# Patient Record
Sex: MALE | Race: WHITE | Employment: FULL TIME | URBAN - METROPOLITAN AREA
[De-identification: names, ages, dates, MRNs, and addresses within clinical notes are randomized per-mention and may not be internally consistent; named-entity substitution may affect disease eponyms.]

---

## 2020-12-30 ENCOUNTER — HOSPITAL ENCOUNTER (OUTPATIENT)
Dept: LAB | Age: 60
Discharge: HOME | End: 2020-12-30
Payer: COMMERCIAL

## 2020-12-30 ENCOUNTER — OFFICE VISIT (OUTPATIENT)
Dept: HEMATOLOGY | Age: 60
End: 2020-12-30
Payer: COMMERCIAL

## 2020-12-30 VITALS
SYSTOLIC BLOOD PRESSURE: 154 MMHG | HEART RATE: 58 BPM | HEIGHT: 70 IN | WEIGHT: 175.25 LBS | BODY MASS INDEX: 25.09 KG/M2 | DIASTOLIC BLOOD PRESSURE: 83 MMHG | OXYGEN SATURATION: 99 % | TEMPERATURE: 98.6 F

## 2020-12-30 DIAGNOSIS — B18.2 CHRONIC HEPATITIS C WITHOUT HEPATIC COMA (HCC): ICD-10-CM

## 2020-12-30 DIAGNOSIS — B18.2 CHRONIC HEPATITIS C WITHOUT HEPATIC COMA (HCC): Primary | ICD-10-CM

## 2020-12-30 LAB
HBV SURFACE AB SER QL IA: NEGATIVE
HBV SURFACE AB SERPL IA-ACNC: <3.1 MIU/ML
HBV SURFACE AG SER QL: <0.1 INDEX
HBV SURFACE AG SER QL: NEGATIVE
HEP BS AB COMMENT,HBSAC: ABNORMAL

## 2020-12-30 PROCEDURE — 87340 HEPATITIS B SURFACE AG IA: CPT

## 2020-12-30 PROCEDURE — 36415 COLL VENOUS BLD VENIPUNCTURE: CPT

## 2020-12-30 PROCEDURE — 99203 OFFICE O/P NEW LOW 30 MIN: CPT | Performed by: INTERNAL MEDICINE

## 2020-12-30 PROCEDURE — 86706 HEP B SURFACE ANTIBODY: CPT

## 2020-12-30 PROCEDURE — 86708 HEPATITIS A ANTIBODY: CPT

## 2020-12-30 PROCEDURE — 86704 HEP B CORE ANTIBODY TOTAL: CPT

## 2020-12-30 NOTE — PROGRESS NOTES
[facility] hospitals, Jose DUEÑAS, Alise Mcduffie MD, MPH      Maged Alston, PA-ARABELLA Nova, [facility]     Uyen Anguiano, [facility]   OLY Finn-ARABELLA Luis, [facility]       William Keane Kimani De Mcnulty 136    at [address], [address] [place] [address]    [address] [facility].    [phone]    FAX: [address] [place] [address], [address]    [phone]    FAX: [phone]         Patient Care Team:  El Pope MD as PCP - General (Internal Medicine)      Problem List  Date Reviewed: 12/30/2020          Codes Class Noted    Chronic hepatitis C [facility]) ICD-10-CM: O35.3  ICD-9-CM: 070.54  12/30/2020                The clinicians listed above have asked me to see [facility] in consultation regarding chronic HCV and its management. All medical records sent by the referring physicians were reviewed     The patient is a 61 y.o.  male who was found to have abnormalities in liver chemistries and subsequently tested positive for chronic HCV in 2000. Risk factors for acquiring HCV are not apparent. There was no history of acute icteric hepatitis at the time of these risk factors. Imaging of the liver was not recently performed. The patient was seen by me in the early 2000s at [facility] in [address] [facility]. A liver biopsy was performed in 2000. The results are not available at this time. The patient is not sure what the results were. He was treated with PEGINF and RBV for 6 months. Treatment was then stopped. The patient has never received treatment for chronic HCV. The patient has no symptoms which can be attributed to the liver disorder.     The patient is not currently experiencing the following symptoms of liver disease:  fatigue, pain in the right side over the liver,     The patient completes all daily activities without any functional limitations. ASSESSMENT AND PLAN:  Chronic HCV   Chronic HCV of unclear severity. AST is normal.  ALT is elevated. ALP is normal.  Liver function is normal.  The platelet count is normal.      HCV viral load is Log 6.3 intU. HCV genotype is 1A. Will perform  serologic studies for HBV. Will perform imaging of the liver with ultrasound. The need to perform an assessment of liver fibrosis was discussed with the patient. The Fibroscan can assess liver fibrosis and determine if a patient has advanced fibrosis or cirrhosis without the need for liver biopsy. This will be performed at the next office visit. Chronic HCV Treatment  The patient was previously treated for HCV with peginterferon and ribavirin   The previous treatment response was probably a partial virologic response. The patient has HCV genotype 1A. The patient should be treated with Harvoni (sofasbuvir and ledipasvir), Mavyret (glecaprevir and piprentasvir) or Epclusa (sofosbuvir and valpitasvir). The SVR/cure rate for is over 95%. Screening for Hepatocellular Carcinoma  HCC screening is not necessary if the patient has no evidence of cirrhosis. Treatment of other medical problems in patients with chronic liver disease  There are no contraindications for the patient to take most medications that are necessary for treatment of other medical issues. Counseling for alcohol in patients with chronic liver disease  The patient was counseled regarding alcohol consumption and the effect of alcohol on chronic liver disease. The patient does not consume a significant amount of alcohol. Vaccinations   The need for vaccination against viral hepatitis A and B will be assessed with serologic and instituted as appropriate.   Routine vaccinations against other bacterial and viral agents can be performed as indicated. Annual flu vaccination should be administered if indicated. ALLERGIES  Not on File    MEDICATIONS  No current outpatient medications on file. No current facility-administered medications for this visit. SYSTEM REVIEW NOT RELATED TO LIVER DISEASE OR REVIEWED ABOVE:  Constitution systems: Negative for fever, chills, weight gain, weight loss. Eyes: Negative for visual changes. ENT: Negative for sore throat, painful swallowing. Respiratory: Negative for cough, hemoptysis, SOB. Cardiology: Negative for chest pain, palpitations. GI:  Negative for constipation or diarrhea. : Negative for urinary frequency, dysuria, hematuria, nocturia. Skin: Negative for rash. Hematology: Negative for easy bruising, blood clots. Musculo-skelatal: Negative for back pain, muscle pain, weakness. Neurologic: Negative for headaches, dizziness, vertigo, memory problems not related to HE. Psychology: Negative for anxiety, depression. FAMILY HISTORY:  The father  of MI. The mother Has/had the following chronic disease(s): HTN,. There is no family history of liver disease. SOCIAL HISTORY:  The patient is . The spouse has been tested for HCV and is negative. The patient has 2 children, and 3 grandchildren. The patient has never used tobacco products. The patient consumes 3-5 alcoholic beverages per week. The patient currently works full time  for [employer]. PHYSICAL EXAMINATION:  Visit Vitals  BP (!) 154/83   Pulse (!) 58   Temp 98.6 °F (37 °C) (Tympanic)   Ht 5' 10\" (1.778 m)   Wt 175 lb 4 oz (79.5 kg)   SpO2 99%   BMI 25.15 kg/m²     General: No acute distress. Eyes: Sclera anicteric. ENT: No oral lesions. Thyroid normal.  Nodes: No adenopathy. Skin: No spider angiomata. No jaundice. No palmar erythema. Respiratory: Lungs clear to auscultation. Cardiovascular: Regular heart rate. No murmurs.   No JVD.  Abdomen: Soft non-tender. Liver size normal to percussion/palpation. Spleen not palpable. No obvious ascites. Extremities: No edema. No muscle wasting. No gross arthritic changes. Neurologic: Alert and oriented. Cranial nerves grossly intact. No asterixis. LABORATORY STUDIES:  From 11/2020  AST/ALT/ALP/T Bili/ALB:  26/41/42/0.6/3.9  WBC/HB/PLT/INR:  7.2/14.5/227  NA/BUN/CREAT:  13/0.83    SEROLOGIES:  11/2019. HCV RNA Log 6.75 intU  11/2020. HCV RNA log 6.31 intU, HCV genotype 1A    LIVER HISTOLOGY:  Not available or performed    ENDOSCOPIC PROCEDURES:  Not available or performed    RADIOLOGY:  Not available or performed    OTHER TESTING:  Not available or performed    FOLLOW-UP:  All of the issues listed above in the Assessment and Plan were discussed with the patient. All questions were answered. The patient expressed a clear understanding of the above. [address] [facility] 87 in 2 weeks for Fibroscan and to initiate HCV treatment.       Deejay Pineda MD  [address]  [address] [facility], [address] [place] [address] Sandi Hernandez, [address]  [address] [facility] skin normal color for race, warm, dry and intact.

## 2020-12-30 NOTE — Clinical Note
12/30/2020 Patient: Aviva Morales YOB: 1960 Date of Visit: 12/30/2020 Tangela Pereira MD 
[facility] [phone] Via Fax: [phone] Dear Tangela Pereira MD, Thank you for referring Mr. Saray Mc to [facility] Perla Whitman for evaluation. My notes for this consultation are attached. If you have questions, please do not hesitate to call me. I look forward to following your patient along with you. Sincerely, Yoko Gilliam MD

## 2020-12-31 LAB
HAV AB SER QL IA: NEGATIVE
HBV CORE AB SERPL QL IA: NEGATIVE

## 2021-01-15 ENCOUNTER — HOSPITAL ENCOUNTER (OUTPATIENT)
Dept: ULTRASOUND IMAGING | Age: 61
Discharge: HOME | End: 2021-01-15
Attending: INTERNAL MEDICINE
Payer: COMMERCIAL

## 2021-01-15 PROCEDURE — 76705 ECHO EXAM OF ABDOMEN: CPT

## 2021-01-19 ENCOUNTER — OFFICE VISIT (OUTPATIENT)
Dept: HEMATOLOGY | Age: 61
End: 2021-01-19
Payer: COMMERCIAL

## 2021-01-19 VITALS
RESPIRATION RATE: 16 BRPM | BODY MASS INDEX: 25.2 KG/M2 | OXYGEN SATURATION: 98 % | WEIGHT: 176 LBS | DIASTOLIC BLOOD PRESSURE: 87 MMHG | HEIGHT: 70 IN | TEMPERATURE: 97.9 F | HEART RATE: 57 BPM | SYSTOLIC BLOOD PRESSURE: 159 MMHG

## 2021-01-19 DIAGNOSIS — B18.2 CHRONIC HEPATITIS C WITHOUT HEPATIC COMA (HCC): Primary | ICD-10-CM

## 2021-01-19 PROCEDURE — 91200 LIVER ELASTOGRAPHY: CPT | Performed by: NURSE PRACTITIONER

## 2021-01-19 PROCEDURE — 99214 OFFICE O/P EST MOD 30 MIN: CPT | Performed by: NURSE PRACTITIONER

## 2021-01-19 RX ORDER — GLECAPREVIR AND PIBRENTASVIR 40; 100 MG/1; MG/1
3 TABLET, FILM COATED ORAL DAILY
Qty: 84 TAB | Refills: 1 | Status: SHIPPED | OUTPATIENT
Start: 2021-01-19 | End: 2021-01-25

## 2021-01-19 NOTE — PROGRESS NOTES
[address] [facility] [place], Cite Mongi Slim, Grayce Barcelona, MD, MPH      ANTONINO Sanchez [facility]     April S Annmarie [facility]   FORTUNATO Red [facility]       [facility] [place] [facility] De Mcnulty 136    at [address], [facility] Kalie Malcolm  22.    [phone]    FAX: [address] [place] [address], [address]    [phone]    FAX: [phone]         Patient Care Team:  Chris Leonardo MD as PCP - General (Internal Medicine)      Problem List  Date Reviewed: 1/19/2021          Codes Class Noted    Chronic hepatitis C [facility]) ICD-10-CM: B18.2  ICD-9-CM: 070.54  12/30/2020                Clarence Garcia returns to the [facility] [employer] today for fibroscan assessment of hepatic fibrosis and for education and management of chronic hepatitis C. The active problem list, all pertinent past medical history, medications, liver histology, endoscopic studies, radiologic findings and laboratory findings related to the liver disorder were reviewed with the patient. The patient is a 64 y.o.  male who was found to have abnormalities in liver chemistries and subsequently tested positive for chronic HCV in 2000. Risk factors for acquiring HCV are not apparent. There was no history of acute icteric hepatitis at the time of these risk factors. Imaging of the liver not recently performed with ultrasound. Ultrasound imaging demonstrates a normal appearing liver. There are no suspicious hepatic masses. The patient was seen by Dr. Kaylin Barton  in the early 2000s at [facility] in [place]. A liver biopsy was performed in 2000. The results are not available at this time.   The patient is not sure what the results were. Fibroscan analysis was performed in the office today. Results of the scan indicate mild to moderate hepatic fibrosis with a Metavir Fibrosis score of F2. There is no evidence of fatty liver per the scan. The patient was treated with PEGINF and RBV for 6 months in the early 2000's at [facility]. Treatment was then stopped. The response to treatment is not available at this time. The patient is unsure of his response to treatment. The patient has no symptoms which can be attributed to the liver disorder. The patient completes all daily activities without any functional limitations. The patient has not experienced fatigue, fevers, chills, shortness of breath, chest pain, pain in the right side over the liver, diffuse abdominal pain, nausea, vomiting, constipation, diarrhrea, dry eyes, dry mouth, arthralgias, myalgias, yellowing of the eyes or skin, itching, dark urine, problems concentrating, swelling of the abdomen, swelling of the lower extremities, hematemesis, or hematochezia. ASSESSMENT AND PLAN:  Chronic HCV   Chronic HCV of with mild to moderate hepatic fibrosis/liver stiffness. The most recent laboratory studies indicate that the liver transaminases are normal, alkaline phosphatase is normal, tests of hepatic synthetic and metabolic function are   normal, and the platelet count is normal.      HCV viral load is Log 6.3 intU. HCV genotype is 1A. The need to perform an assessment of liver fibrosis was discussed with the patient. The Fibroscan can assess liver fibrosis and determine if a patient has advanced fibrosis or cirrhosis without the need for liver biopsy. This was performed in the office during this appointment. Results of the scan indicate mild to moderate hepatic fibrosis. There is no evidence of hepatic steatosis.       The Fibroscan can be repeated annually or as often as clinically indicated to assess for fibrosis progression and/or regression. Chronic HCV Treatment  The patient was previously treated for HCV with peginterferon and ribavirin. The previous treatment response was probably a partial virologic response. The patient has HCV genotype 1A. The patient prefers the shorter duration of antiviral therapy offered by using Mavyret. Mckenzie Victor Hugo is a maldonado genotypic treatment regime utilizing glecaprevir (an NS3/4A protease inhibitor) and pibrentasvir (an NS5A inhibitor) for 8 weeks. I explained that he is to take three tabs daily in a single dose with food. The treatment regime was discussed in detail, including duration of treatment, dosing of the medication, and medication side effects. Educational material was provided. Mckenzie Flair was ordered during this appointment via William [address] in [place]. Screening for Hepatocellular Carcinoma  HCC screening has been completed. There is no indication of emerging HCC. Treatment of other medical problems in patients with chronic liver disease  There are no contraindications for the patient to take most medications that are necessary for treatment of other medical issues. Counseling for alcohol in patients with chronic liver disease  The patient was counseled regarding alcohol consumption and the effect of alcohol on chronic liver disease. The patient does not consume a significant amount of alcohol. Vaccinations   Vaccination for viral hepatitis A and B is recommended. The patient does not have serologic evidence of prior exposure or vaccination with immunity. Routine vaccinations against other bacterial and viral agents can be performed as indicated. Annual flu vaccination should be administered if indicated. ALLERGIES  No Known Allergies    MEDICATIONS  No current outpatient medications on file. No current facility-administered medications for this visit.         SYSTEM REVIEW NOT RELATED TO LIVER DISEASE OR REVIEWED ABOVE:  Constitution systems: Negative for fever, chills, weight gain, weight loss. Eyes: Negative for visual changes. ENT: Negative for sore throat, painful swallowing. Respiratory: Negative for cough, hemoptysis, SOB. Cardiology: Negative for chest pain, palpitations. GI:  Negative for constipation or diarrhea. : Negative for urinary frequency, dysuria, hematuria, nocturia. Skin: Negative for rash. Hematology: Negative for easy bruising, blood clots. Musculo-skelatal: Negative for back pain, muscle pain, weakness. Neurologic: Negative for headaches, dizziness, vertigo, memory problems not related to HE. Psychology: Negative for anxiety, depression. FAMILY HISTORY:  The father  of MI. The mother Has/had the following chronic disease(s): HTN,. There is no family history of liver disease. SOCIAL HISTORY:  The patient is . The spouse has been tested for HCV and is negative. The patient has 2 children, and 3 grandchildren. The patient has never used tobacco products. The patient consumes 3-5 alcoholic beverages per week. The patient currently works full time  for [employer]. PHYSICAL EXAMINATION:  Visit Vitals  BP (!) 159/87 (BP 1 Location: Right arm, BP Patient Position: Sitting)   Pulse (!) 57   Temp 97.9 °F (36.6 °C)   Resp 16   Ht 5' 10\" (1.778 m)   Wt 176 lb (79.8 kg)   SpO2 98%   BMI 25.25 kg/m²     General: No acute distress. Eyes: Sclera anicteric. ENT: No oral lesions. Thyroid normal.  Nodes: No adenopathy. Skin: No spider angiomata. No jaundice. No palmar erythema. Respiratory: Lungs clear to auscultation. Cardiovascular: Regular heart rate. No murmurs. No JVD. Abdomen: Soft non-tender. Liver size normal to percussion/palpation. Spleen not palpable. No obvious ascites. Extremities: No edema. No muscle wasting. No gross arthritic changes. Neurologic: Alert and oriented. Cranial nerves grossly intact. No asterixis.     LABORATORY STUDIES:  From 11/2020  AST/ALT/ALP/T Bili/ALB:  26/41/42/0.6/3.9  WBC/HB/PLT/INR:  7.2/14.5/227  NA/BUN/CREAT:  13/0.83    SEROLOGIES:  Serologies Latest Ref Rng & Units 12/30/2020   Hep A Ab, Total Negative   Negative   Hep B Surface Ag <1.00 Index <0.10   Hep B Surface Ag Interp NEG   Negative   Hep B Core Ab, Total Negative   Negative   Hep B Surface Ab >10.0 mIU/mL <3.10 (L)   Hep B Surface Ab Interp POS   Negative (A)     11/2019. HCV RNA Log 6.75 intU  11/2020. HCV RNA log 6.31 intU, HCV genotype 1A    LIVER HISTOLOGY:  01/2021. [facility]. EkPa was 9.6. IQR/med 5%. . The results suggested a fibrosis level of F2. The CAP score suggests  there is no significant hepatic steatosis. ENDOSCOPIC PROCEDURES:  Not available or performed    RADIOLOGY:  01/2021. Ultrasound of the liver. Normal sonographic appearance of the liver. No liver mass identified on this study    OTHER TESTING:  Not available or performed    FOLLOW-UP:  All of the issues listed above in the Assessment and Plan were discussed with the patient. All questions were answered. The patient expressed a clear understanding of the above. [address] in 6 weeks to asses his response to antiviral therapy.       OLY Baltazar-C  Liver [place] of [address], [address] [place] [address]   [phone]

## 2021-01-25 RX ORDER — GLECAPREVIR AND PIBRENTASVIR 40; 100 MG/1; MG/1
3 TABLET, FILM COATED ORAL DAILY
Qty: 84 TAB | Refills: 1 | Status: SHIPPED | OUTPATIENT
Start: 2021-01-25 | End: 2021-03-22

## 2021-03-04 ENCOUNTER — OFFICE VISIT (OUTPATIENT)
Dept: HEMATOLOGY | Age: 61
End: 2021-03-04
Payer: COMMERCIAL

## 2021-03-04 ENCOUNTER — HOSPITAL ENCOUNTER (OUTPATIENT)
Dept: LAB | Age: 61
Discharge: HOME | End: 2021-03-04
Payer: COMMERCIAL

## 2021-03-04 VITALS
WEIGHT: 172 LBS | BODY MASS INDEX: 24.62 KG/M2 | SYSTOLIC BLOOD PRESSURE: 175 MMHG | RESPIRATION RATE: 16 BRPM | TEMPERATURE: 97.7 F | HEIGHT: 70 IN | DIASTOLIC BLOOD PRESSURE: 87 MMHG | OXYGEN SATURATION: 98 % | HEART RATE: 78 BPM

## 2021-03-04 DIAGNOSIS — B18.2 CHRONIC HEPATITIS C WITHOUT HEPATIC COMA (HCC): ICD-10-CM

## 2021-03-04 DIAGNOSIS — B18.2 CHRONIC HEPATITIS C WITHOUT HEPATIC COMA (HCC): Primary | ICD-10-CM

## 2021-03-04 LAB
ALBUMIN SERPL-MCNC: 4.3 G/DL (ref 3.4–5)
ALBUMIN/GLOB SERPL: 1.3 {RATIO} (ref 0.8–1.7)
ALP SERPL-CCNC: 60 U/L (ref 45–117)
ALT SERPL-CCNC: 23 U/L (ref 16–61)
ANION GAP SERPL CALC-SCNC: 6 MMOL/L (ref 3–18)
AST SERPL-CCNC: 20 U/L (ref 10–38)
BASOPHILS # BLD: 0.1 K/UL (ref 0–0.1)
BASOPHILS NFR BLD: 1 % (ref 0–2)
BILIRUB DIRECT SERPL-MCNC: 0.3 MG/DL (ref 0–0.2)
BILIRUB SERPL-MCNC: 1.5 MG/DL (ref 0.2–1)
BUN SERPL-MCNC: 14 MG/DL (ref 7–18)
BUN/CREAT SERPL: 16 (ref 12–20)
CALCIUM SERPL-MCNC: 8.9 MG/DL (ref 8.5–10.1)
CHLORIDE SERPL-SCNC: 107 MMOL/L (ref 100–111)
CO2 SERPL-SCNC: 30 MMOL/L (ref 21–32)
CREAT SERPL-MCNC: 0.9 MG/DL (ref 0.6–1.3)
DIFFERENTIAL METHOD BLD: NORMAL
EOSINOPHIL # BLD: 0.2 K/UL (ref 0–0.4)
EOSINOPHIL NFR BLD: 2 % (ref 0–5)
ERYTHROCYTE [DISTWIDTH] IN BLOOD BY AUTOMATED COUNT: 12.8 % (ref 11.6–14.5)
GLOBULIN SER CALC-MCNC: 3.3 G/DL (ref 2–4)
GLUCOSE SERPL-MCNC: 87 MG/DL (ref 74–99)
HCT VFR BLD AUTO: 45.6 % (ref 36–48)
HGB BLD-MCNC: 14.9 G/DL (ref 13–16)
LYMPHOCYTES # BLD: 2.9 K/UL (ref 0.9–3.6)
LYMPHOCYTES NFR BLD: 34 % (ref 21–52)
MCH RBC QN AUTO: 30.7 PG (ref 24–34)
MCHC RBC AUTO-ENTMCNC: 32.7 G/DL (ref 31–37)
MCV RBC AUTO: 93.8 FL (ref 74–97)
MONOCYTES # BLD: 0.5 K/UL (ref 0.05–1.2)
MONOCYTES NFR BLD: 6 % (ref 3–10)
NEUTS SEG # BLD: 5 K/UL (ref 1.8–8)
NEUTS SEG NFR BLD: 57 % (ref 40–73)
PLATELET # BLD AUTO: 244 K/UL (ref 135–420)
PMV BLD AUTO: 10.2 FL (ref 9.2–11.8)
POTASSIUM SERPL-SCNC: 4 MMOL/L (ref 3.5–5.5)
PROT SERPL-MCNC: 7.6 G/DL (ref 6.4–8.2)
RBC # BLD AUTO: 4.86 M/UL (ref 4.7–5.5)
SODIUM SERPL-SCNC: 143 MMOL/L (ref 136–145)
WBC # BLD AUTO: 8.6 K/UL (ref 4.6–13.2)

## 2021-03-04 PROCEDURE — 99214 OFFICE O/P EST MOD 30 MIN: CPT | Performed by: NURSE PRACTITIONER

## 2021-03-04 PROCEDURE — 85025 COMPLETE CBC W/AUTO DIFF WBC: CPT

## 2021-03-04 PROCEDURE — 80048 BASIC METABOLIC PNL TOTAL CA: CPT

## 2021-03-04 PROCEDURE — 36415 COLL VENOUS BLD VENIPUNCTURE: CPT

## 2021-03-04 PROCEDURE — 87522 HEPATITIS C REVRS TRNSCRPJ: CPT

## 2021-03-04 PROCEDURE — 80076 HEPATIC FUNCTION PANEL: CPT

## 2021-03-04 NOTE — PROGRESS NOTES
3340 [facility], MD, [address], [place] Mikel Desai, Laureen Kaur MD, MPH      TETO Mallory-ARABELLA Ya, [facility]     Uyen Anguiano, [facility]   [place] SAMSON-ARABELLA Lewis, [facility]       [facility] 136    at [address], [facility] Kalie Malcolm  22.    [phone]    FAX: [address] [place] [address], [address]    [phone]    FAX: [phone]       Patient Care Team:  Mandy De Guzman MD as PCP - General (Internal Medicine)      Problem List  Date Reviewed: 3/4/2021          Codes Class Noted    Chronic hepatitis C [facility]) ICD-10-CM: B18.2  ICD-9-CM: 070.54  12/30/2020                Elsie Birmingham returns to the [facility] [employer] today for education and management of chronic hepatitis C. The patient began an 8 week course of HCV treatment with Mavyret \"about 3 or 4 weeks ago\". The patient was treated with PEGINF and RBV for 6 months in the early 2000's at [facility]. Treatment was then stopped. The response to treatment is not available at this time. The patient is unsure of his response to treatment. The active problem list, all pertinent past medical history, medications, liver histology, endoscopic studies, radiologic findings and laboratory findings related to the liver disorder were reviewed with the patient. The patient is a 64 y.o.  male who was found to have abnormalities in liver chemistries and subsequently tested positive for chronic HCV in 2000. Risk factors for acquiring HCV are not apparent. There was no history of acute icteric hepatitis at the time of these risk factors. Imaging of the liver recently performed with ultrasound.   Ultrasound imaging demonstrates a normal appearing liver. There are no suspicious hepatic masses. The patient was seen by Dr. Jefe Tejeda  in the early 2000s at [facility] in [place]. A liver biopsy was performed in 2000. The results are not available at this time. The patient is not sure what the results were. Fibroscan analysis was performed in the office in 01/2021. Results of the scan indicate mild to moderate hepatic fibrosis with a Metavir Fibrosis score of F2. There is no evidence of fatty liver per the scan. The patient has no symptoms which can be attributed to the liver disorder. The patient completes all daily activities without any functional limitations. The patient has not experienced fatigue, fevers, chills, shortness of breath, chest pain, pain in the right side over the liver, diffuse abdominal pain, nausea, vomiting, constipation, diarrhrea, dry eyes, dry mouth, arthralgias, myalgias, yellowing of the eyes or skin, itching, dark urine, problems concentrating, swelling of the abdomen, swelling of the lower extremities, hematemesis, or hematochezia. ASSESSMENT AND PLAN:  Chronic HCV   Chronic HCV of with mild to moderate hepatic fibrosis/liver stiffness. The most recent laboratory studies indicate that the liver transaminases are normal, alkaline phosphatase is normal, tests of hepatic synthetic and metabolic function are   normal, and the platelet count is normal.      HCV viral load is Log 6.3 intU. HCV genotype is 1A. The need to perform an assessment of liver fibrosis was discussed with the patient. The Fibroscan can assess liver fibrosis and determine if a patient has advanced fibrosis or cirrhosis without the need for liver biopsy. This was performed in the office during this appointment. Results of the scan indicate mild to moderate hepatic fibrosis. There is no evidence of hepatic steatosis.       The Fibroscan can be repeated annually or as often as clinically indicated to assess for fibrosis progression and/or regression. Chronic HCV Treatment  The patient was previously treated for HCV with peginterferon and ribavirin. The previous treatment response was probably a partial virologic response. The patient has HCV genotype 1A. The patient is in the middle of an 8 week antiviral treatment regime. He is being treated with Mavyret. He has no treatment related complaints. He denies missing any doses of the medication. Screening for Hepatocellular Carcinoma  HCC screening has been completed. There is no indication of emerging HCC. Treatment of other medical problems in patients with chronic liver disease  There are no contraindications for the patient to take most medications that are necessary for treatment of other medical issues. Counseling for alcohol in patients with chronic liver disease  The patient was counseled regarding alcohol consumption and the effect of alcohol on chronic liver disease. The patient does not consume a significant amount of alcohol. Vaccinations   Vaccination for viral hepatitis A and B is recommended. The patient does not have serologic evidence of prior exposure or vaccination with immunity. Routine vaccinations against other bacterial and viral agents can be performed as indicated. Annual flu vaccination should be administered if indicated. ALLERGIES  No Known Allergies    MEDICATIONS  Current Outpatient Medications   Medication Sig    glecaprevir-pibrentasvir (Mavyret) 100-40 mg tab Take 3 Tabs by mouth daily for 56 days. No current facility-administered medications for this visit. SYSTEM REVIEW NOT RELATED TO LIVER DISEASE OR REVIEWED ABOVE:  Constitution systems: Negative for fever, chills, weight gain, weight loss. Eyes: Negative for visual changes. ENT: Negative for sore throat, painful swallowing. Respiratory: Negative for cough, hemoptysis, SOB. Cardiology: Negative for chest pain, palpitations.   GI: Negative for constipation or diarrhea. : Negative for urinary frequency, dysuria, hematuria, nocturia. Skin: Negative for rash. Hematology: Negative for easy bruising, blood clots. Musculo-skelatal: Negative for back pain, muscle pain, weakness. Neurologic: Negative for headaches, dizziness, vertigo, memory problems not related to HE. Psychology: Negative for anxiety, depression. FAMILY HISTORY:  The father  of MI. The mother Has/had the following chronic disease(s): HTN,. There is no family history of liver disease. SOCIAL HISTORY:  The patient is . The spouse has been tested for HCV and is negative. The patient has 2 children, and 3 grandchildren. The patient has never used tobacco products. The patient consumes 3-5 alcoholic beverages per week. The patient currently works full time  for [employer]. PHYSICAL EXAMINATION:  Visit Vitals  BP (!) 175/87 (BP 1 Location: Right arm, BP Patient Position: Sitting)   Pulse 78   Temp 97.7 °F (36.5 °C)   Resp 16   Ht 5' 10\" (1.778 m)   Wt 172 lb (78 kg)   SpO2 98%   BMI 24.68 kg/m²     General: No acute distress. Eyes: Sclera anicteric. ENT: No oral lesions. Thyroid normal.  Nodes: No adenopathy. Skin: No spider angiomata. No jaundice. No palmar erythema. Respiratory: Lungs clear to auscultation. Cardiovascular: Regular heart rate. No murmurs. No JVD. Abdomen: Soft non-tender. Liver size normal to percussion/palpation. Spleen not palpable. No obvious ascites. Extremities: No edema. No muscle wasting. No gross arthritic changes. Neurologic: Alert and oriented. Cranial nerves grossly intact. No asterixis.     LABORATORY STUDIES:  Liver [place] of [address] & Units 3/4/2021   WBC 4.6 - 13.2 K/uL 8.6   ANC 1.8 - 8.0 K/UL 5.0   HGB 13.0 - 16.0 g/dL 14.9    - 420 K/uL 244   AST 10 - 38 U/L 20   ALT 16 - 61 U/L 23   Alk Phos 45 - 117 U/L 60   Bili, Total 0.2 - 1.0 MG/DL 1.5 (H)   Bili, Direct 0.0 - 0.2 MG/DL 0.3 (H)   Albumin 3.4 - 5.0 g/dL 4.3   BUN 7.0 - 18 MG/DL 14   Creat 0.6 - 1.3 MG/DL 0.90   Na 136 - 145 mmol/L 143   K 3.5 - 5.5 mmol/L 4.0   Cl 100 - 111 mmol/L 107   CO2 21 - 32 mmol/L 30   Glucose 74 - 99 mg/dL 87     From 11/2020  AST/ALT/ALP/T Bili/ALB:  26/41/42/0.6/3.9  WBC/HB/PLT/INR:  7.2/14.5/227  NA/BUN/CREAT:  13/0.83    SEROLOGIES:  Serologies Latest Ref Rng & Units 12/30/2020   Hep A Ab, Total Negative   Negative   Hep B Surface Ag <1.00 Index <0.10   Hep B Surface Ag Interp NEG   Negative   Hep B Core Ab, Total Negative   Negative   Hep B Surface Ab >10.0 mIU/mL <3.10 (L)   Hep B Surface Ab Interp POS   Negative (A)     11/2019. HCV RNA Log 6.75 intU  11/2020. HCV RNA log 6.31 intU, HCV genotype 1A    LIVER HISTOLOGY:  01/2021. [facility] EkPa was 9.6. IQR/med 5%. . The results suggested a fibrosis level of F2. The CAP score suggests there is no significant hepatic steatosis. ENDOSCOPIC PROCEDURES:  Not available or performed    RADIOLOGY:  01/2021. Ultrasound of the liver. Normal sonographic appearance of the liver. No liver mass identified on this study    OTHER TESTING:  Not available or performed    FOLLOW-UP:  All of the issues listed above in the Assessment and Plan were discussed with the patient. All questions were answered. The patient expressed a clear understanding of the above. [address] in 18 weeks to asses his response to antiviral therapy.       Keyur King, FNP-C  Liver [place] of [address], [address] [place] [address]   [phone]

## 2021-03-05 LAB
HCV RNA SERPL NAA+PROBE-LOG IU: <1 HCV LOG 10IU/ML
HCV RNA SERPL PROBE AMP-ACNC: <10 HCVIU/ML

## 2021-07-14 ENCOUNTER — OFFICE VISIT (OUTPATIENT)
Dept: HEMATOLOGY | Age: 61
End: 2021-07-14
Payer: COMMERCIAL

## 2021-07-14 ENCOUNTER — HOSPITAL ENCOUNTER (OUTPATIENT)
Dept: LAB | Age: 61
Discharge: HOME | End: 2021-07-14
Payer: COMMERCIAL

## 2021-07-14 VITALS
RESPIRATION RATE: 16 BRPM | HEIGHT: 70 IN | OXYGEN SATURATION: 98 % | WEIGHT: 177 LBS | HEART RATE: 57 BPM | SYSTOLIC BLOOD PRESSURE: 146 MMHG | TEMPERATURE: 98.1 F | BODY MASS INDEX: 25.34 KG/M2 | DIASTOLIC BLOOD PRESSURE: 89 MMHG

## 2021-07-14 DIAGNOSIS — B18.2 CHRONIC HEPATITIS C WITHOUT HEPATIC COMA (HCC): Primary | ICD-10-CM

## 2021-07-14 DIAGNOSIS — B18.2 CHRONIC HEPATITIS C WITHOUT HEPATIC COMA (HCC): ICD-10-CM

## 2021-07-14 DIAGNOSIS — Z86.19 HEPATITIS C VIRUS INFECTION CURED AFTER ANTIVIRAL DRUG THERAPY: ICD-10-CM

## 2021-07-14 LAB
ALBUMIN SERPL-MCNC: 4.1 G/DL (ref 3.4–5)
ALBUMIN/GLOB SERPL: 1.2 {RATIO} (ref 0.8–1.7)
ALP SERPL-CCNC: 56 U/L (ref 45–117)
ALT SERPL-CCNC: 20 U/L (ref 16–61)
ANION GAP SERPL CALC-SCNC: 6 MMOL/L (ref 3–18)
AST SERPL-CCNC: 14 U/L (ref 10–38)
BASOPHILS # BLD: 0.1 K/UL (ref 0–0.1)
BASOPHILS NFR BLD: 1 % (ref 0–2)
BILIRUB DIRECT SERPL-MCNC: 0.2 MG/DL (ref 0–0.2)
BILIRUB SERPL-MCNC: 0.9 MG/DL (ref 0.2–1)
BUN SERPL-MCNC: 12 MG/DL (ref 7–18)
BUN/CREAT SERPL: 15 (ref 12–20)
CALCIUM SERPL-MCNC: 8.6 MG/DL (ref 8.5–10.1)
CHLORIDE SERPL-SCNC: 107 MMOL/L (ref 100–111)
CO2 SERPL-SCNC: 29 MMOL/L (ref 21–32)
CREAT SERPL-MCNC: 0.78 MG/DL (ref 0.6–1.3)
DIFFERENTIAL METHOD BLD: NORMAL
EOSINOPHIL # BLD: 0.2 K/UL (ref 0–0.4)
EOSINOPHIL NFR BLD: 4 % (ref 0–5)
ERYTHROCYTE [DISTWIDTH] IN BLOOD BY AUTOMATED COUNT: 13.2 % (ref 11.6–14.5)
GLOBULIN SER CALC-MCNC: 3.3 G/DL (ref 2–4)
GLUCOSE SERPL-MCNC: 90 MG/DL (ref 74–99)
HCT VFR BLD AUTO: 45 % (ref 36–48)
HGB BLD-MCNC: 14.8 G/DL (ref 13–16)
LYMPHOCYTES # BLD: 2.4 K/UL (ref 0.9–3.6)
LYMPHOCYTES NFR BLD: 39 % (ref 21–52)
MCH RBC QN AUTO: 31.2 PG (ref 24–34)
MCHC RBC AUTO-ENTMCNC: 32.9 G/DL (ref 31–37)
MCV RBC AUTO: 94.9 FL (ref 74–97)
MONOCYTES # BLD: 0.4 K/UL (ref 0.05–1.2)
MONOCYTES NFR BLD: 7 % (ref 3–10)
NEUTS SEG # BLD: 3.1 K/UL (ref 1.8–8)
NEUTS SEG NFR BLD: 49 % (ref 40–73)
PLATELET # BLD AUTO: 221 K/UL (ref 135–420)
PMV BLD AUTO: 10.2 FL (ref 9.2–11.8)
POTASSIUM SERPL-SCNC: 4.3 MMOL/L (ref 3.5–5.5)
PROT SERPL-MCNC: 7.4 G/DL (ref 6.4–8.2)
RBC # BLD AUTO: 4.74 M/UL (ref 4.35–5.65)
SODIUM SERPL-SCNC: 142 MMOL/L (ref 136–145)
WBC # BLD AUTO: 6.2 K/UL (ref 4.6–13.2)

## 2021-07-14 PROCEDURE — 99214 OFFICE O/P EST MOD 30 MIN: CPT | Performed by: NURSE PRACTITIONER

## 2021-07-14 PROCEDURE — 85025 COMPLETE CBC W/AUTO DIFF WBC: CPT

## 2021-07-14 PROCEDURE — 87522 HEPATITIS C REVRS TRNSCRPJ: CPT

## 2021-07-14 PROCEDURE — 36415 COLL VENOUS BLD VENIPUNCTURE: CPT

## 2021-07-14 PROCEDURE — 80076 HEPATIC FUNCTION PANEL: CPT

## 2021-07-14 PROCEDURE — 80048 BASIC METABOLIC PNL TOTAL CA: CPT

## 2021-07-14 NOTE — PROGRESS NOTES
3340 [facility], MD, [address], [place]ite Bradley Clayton MD, MPH      Clara Georges, PA-C Dannielle Spurling, [facility]     Uyen Anguiano, [facility]   Viv Roman [facility]-ARABELLA Spann, [facility]       [facility] 136    at [address], [address] Kalie Malcolm  22.    [phone]    FAX: [address] [place] [address], [address]    [phone]    FAX: [phone]       Patient Care Team:  Vladimir Harris MD as PCP - General (Internal Medicine)      Problem List  Date Reviewed: 7/14/2021        Codes Class Noted    Chronic hepatitis C [facility]) ICD-10-CM: B18.2  ICD-9-CM: 070.54  12/30/2020                Abran Reardon returns to the [place] [address] today for education and management of chronic hepatitis C. The patient completd an 8 week course of HCV treatment with [address] sometime around 03/31/2021. The patient was previously treated with PEGINF and RBV for 6 months in the early 2000's at [facility]. Treatment was then stopped. The response to treatment is not available at this time. The patient is unsure of his response to treatment. The active problem list, all pertinent past medical history, medications, liver histology, endoscopic studies, radiologic findings and laboratory findings related to the liver disorder were reviewed with the patient. The patient is a 64 y.o.  male who was found to have abnormalities in liver chemistries and subsequently tested positive for chronic HCV in 2000. Risk factors for acquiring HCV are not apparent. There was no history of acute icteric hepatitis at the time of these risk factors. Imaging of the liver recently performed with ultrasound.   Ultrasound imaging demonstrates a normal appearing liver. There are no suspicious hepatic masses. The patient was seen by Dr. Anil Abebe  in the early 2000s at [facility] in [place]. A liver biopsy was performed in 2000. The results are not available at this time. The patient is not sure what the results were. Fibroscan analysis was performed in the office in 01/2021. Results of the scan indicate mild to moderate hepatic fibrosis with a Metavir Fibrosis score of F2. There is no evidence of fatty liver per the scan. The patient has no symptoms which can be attributed to the liver disorder. The patient completes all daily activities without any functional limitations. The patient has not experienced fatigue, fevers, chills, shortness of breath, chest pain, pain in the right side over the liver, diffuse abdominal pain, nausea, vomiting, constipation, diarrhrea, dry eyes, dry mouth, arthralgias, myalgias, yellowing of the eyes or skin, itching, dark urine, problems concentrating, swelling of the abdomen, swelling of the lower extremities, hematemesis, or hematochezia. Since the last office appointment:  Finished 8 weeks of Mavyret around 03/31/2021. There is no detectable HCV RNA on labs from 07/14/2021. Patient is cure of the HCV infection. ASSESSMENT AND PLAN:  Chronic HCV   Chronic HCV of with mild to moderate hepatic fibrosis/liver stiffness. The most recent laboratory studies indicate that the liver transaminases are normal, alkaline phosphatase is normal, tests of hepatic synthetic and metabolic function are normal, and the platelet count is normal.      The need to perform an assessment of liver fibrosis was discussed with the patient. The Fibroscan can assess liver fibrosis and determine if a patient has advanced fibrosis or cirrhosis without the need for liver biopsy. This was performed in the office in 01/2021. Results of the scan indicate mild to moderate hepatic fibrosis. There is no evidence of hepatic steatosis. The Fibroscan can be repeated annually or as often as clinically indicated to assess for fibrosis progression and/or regression. Chronic HCV Treatment  The patient was previously treated for HCV with peginterferon and ribavirin. The previous treatment response was probably a partial virologic response. The patient had HCV genotype 1A. The patient completed an 8 week antiviral regime on 03/31/2021. Today's labs prove that he has attained SVR 12 and is cured of the HCV. He was treated with Mavyret. He had no treatment related complaints. He denies missing any doses of the medication. Screening for Hepatocellular Carcinoma  HCC screening has been completed. There is no indication of emerging HCC. Treatment of other medical problems in patients with chronic liver disease  There are no contraindications for the patient to take most medications that are necessary for treatment of other medical issues. Counseling for alcohol in patients with chronic liver disease  The patient was counseled regarding alcohol consumption and the effect of alcohol on chronic liver disease. The patient does not consume a significant amount of alcohol. Vaccinations   Vaccination for viral hepatitis A and B is recommended. The patient does not have serologic evidence of prior exposure or vaccination with immunity. Routine vaccinations against other bacterial and viral agents can be performed as indicated. Annual flu vaccination should be administered if indicated. ALLERGIES  No Known Allergies    MEDICATIONS  No current outpatient medications on file. No current facility-administered medications for this visit. SYSTEM REVIEW NOT RELATED TO LIVER DISEASE OR REVIEWED ABOVE:  Constitution systems: Negative for fever, chills, weight gain, weight loss. Eyes: Negative for visual changes. ENT: Negative for sore throat, painful swallowing. Respiratory: Negative for cough, hemoptysis, SOB. Cardiology: Negative for chest pain, palpitations. GI:  Negative for constipation or diarrhea. : Negative for urinary frequency, dysuria, hematuria, nocturia. Skin: Negative for rash. Hematology: Negative for easy bruising, blood clots. Musculo-skelatal: Negative for back pain, muscle pain, weakness. Neurologic: Negative for headaches, dizziness, vertigo, memory problems not related to HE. Psychology: Negative for anxiety, depression. FAMILY HISTORY:  The father  of MI. The mother Has/had the following chronic disease(s): HTN. There is no family history of liver disease. SOCIAL HISTORY:  The patient is . The spouse has been tested for HCV and is negative. The patient has 2 children, and 3 grandchildren. The patient has never used tobacco products. The patient consumes 3-5 alcoholic beverages per week. The patient currently works full time  for [employer]. PHYSICAL EXAMINATION:  Visit Vitals  BP (!) 146/89 (BP 1 Location: Left upper arm, BP Patient Position: Sitting, BP Cuff Size: Small adult)   Pulse (!) 57   Temp 98.1 °F (36.7 °C)   Resp 16   Ht 5' 10\" (1.778 m)   Wt 177 lb (80.3 kg)   SpO2 98%   BMI 25.40 kg/m²     General: No acute distress. Eyes: Sclera anicteric. ENT: No oral lesions. Thyroid normal.  Nodes: No adenopathy. Skin: No spider angiomata. No jaundice. No palmar erythema. Respiratory: Lungs clear to auscultation. Cardiovascular: Regular heart rate. No murmurs. No JVD. Abdomen: Soft non-tender. Liver size normal to percussion/palpation. Spleen not palpable. No obvious ascites. Extremities: No edema. No muscle wasting. No gross arthritic changes. Neurologic: Alert and oriented. Cranial nerves grossly intact. No asterixis.     LABORATORY STUDIES:  Liver [place] of [address] & Units 2021 3/4/2021   WBC 4.6 - 13.2 K/uL 6.2 8.6 ANC 1.8 - 8.0 K/UL 3.1 5.0   HGB 13.0 - 16.0 g/dL 14.8 14.9    - 420 K/uL 221 244   AST 10 - 38 U/L 14 20   ALT 16 - 61 U/L 20 23   Alk Phos 45 - 117 U/L 56 60   Bili, Total 0.2 - 1.0 MG/DL 0.9 1.5 (H)   Bili, Direct 0.0 - 0.2 MG/DL 0.2 0.3 (H)   Albumin 3.4 - 5.0 g/dL 4.1 4.3   BUN 7.0 - 18 MG/DL 12 14   Creat 0.6 - 1.3 MG/DL 0.78 0.90   Na 136 - 145 mmol/L 142 143   K 3.5 - 5.5 mmol/L 4.3 4.0   Cl 100 - 111 mmol/L 107 107   CO2 21 - 32 mmol/L 29 30   Glucose 74 - 99 mg/dL 90 87     SEROLOGIES:  Serologies Latest Ref Rng & Units 12/30/2020   Hep A Ab, Total Negative   Negative   Hep B Surface Ag <1.00 Index <0.10   Hep B Surface Ag Interp NEG   Negative   Hep B Core Ab, Total Negative   Negative   Hep B Surface Ab >10.0 mIU/mL <3.10 (L)   Hep B Surface Ab Interp POS   Negative (A)     11/2019. HCV RNA Log 6.75 intU  11/2020. HCV RNA log 6.31 intU, HCV genotype 1A    LIVER HISTOLOGY:  01/2021. FibroScan performed at [address]. EkPa was 9.6. IQR/med 5%. . The results suggested a fibrosis level of F2. The CAP score suggests there is no significant hepatic steatosis. ENDOSCOPIC PROCEDURES:  Not available or performed    RADIOLOGY:  01/2021. Ultrasound of the liver. Normal sonographic appearance of the liver. No liver mass identified on this study    OTHER TESTING:  Not available or performed    FOLLOW-UP:  All of the issues listed above in the Assessment and Plan were discussed with the patient. All questions were answered. The patient expressed a clear understanding of the above. Follow-up [facility] on a PRN basis.         Paulina Givens, OLY-C  Liver [place] [facility]  [address], [address] [place] [address], [address] [facility]   [phone]

## 2021-07-15 LAB
HCV RNA SERPL NAA+PROBE-ACNC: NORMAL IU/ML
SERIAL MONITORING: NORMAL
TEST INFORMATION, HCP9T: NORMAL

## 2021-07-16 PROBLEM — Z86.19 HEPATITIS C VIRUS INFECTION CURED AFTER ANTIVIRAL DRUG THERAPY: Status: ACTIVE | Noted: 2021-07-16

## 2021-07-16 PROBLEM — B18.2 CHRONIC HEPATITIS C (HCC): Status: RESOLVED | Noted: 2020-12-30 | Resolved: 2021-07-16
